# Patient Record
Sex: FEMALE | Race: WHITE | NOT HISPANIC OR LATINO | Employment: FULL TIME
[De-identification: names, ages, dates, MRNs, and addresses within clinical notes are randomized per-mention and may not be internally consistent; named-entity substitution may affect disease eponyms.]

---

## 2018-04-08 ENCOUNTER — CHARTING TRANS (OUTPATIENT)
Dept: OTHER | Age: 53
End: 2018-04-08

## 2018-04-21 ENCOUNTER — HOSPITAL ENCOUNTER (OUTPATIENT)
Dept: GENERAL RADIOLOGY | Age: 53
Discharge: HOME | End: 2018-04-21
Attending: INTERNAL MEDICINE
Payer: COMMERCIAL

## 2018-04-21 DIAGNOSIS — R05.9 COUGH: ICD-10-CM

## 2018-04-21 PROCEDURE — 71046 X-RAY EXAM CHEST 2 VIEWS: CPT | Performed by: INTERNAL MEDICINE

## 2018-11-01 VITALS
HEIGHT: 60 IN | WEIGHT: 160 LBS | TEMPERATURE: 99.9 F | SYSTOLIC BLOOD PRESSURE: 150 MMHG | DIASTOLIC BLOOD PRESSURE: 84 MMHG | OXYGEN SATURATION: 97 % | RESPIRATION RATE: 16 BRPM | BODY MASS INDEX: 31.41 KG/M2 | HEART RATE: 119 BPM

## 2019-02-17 ENCOUNTER — WALK IN (OUTPATIENT)
Dept: URGENT CARE | Age: 54
End: 2019-02-17

## 2019-02-17 DIAGNOSIS — J06.9 VIRAL UPPER RESPIRATORY TRACT INFECTION: Primary | ICD-10-CM

## 2019-02-17 PROCEDURE — 99214 OFFICE O/P EST MOD 30 MIN: CPT | Performed by: NURSE PRACTITIONER

## 2019-02-17 RX ORDER — BENZONATATE 200 MG/1
200 CAPSULE ORAL 3 TIMES DAILY PRN
Qty: 15 CAPSULE | Refills: 0 | Status: SHIPPED | OUTPATIENT
Start: 2019-02-17 | End: 2023-04-19 | Stop reason: CLARIF

## 2019-02-17 SDOH — HEALTH STABILITY: MENTAL HEALTH: HOW OFTEN DO YOU HAVE A DRINK CONTAINING ALCOHOL?: NEVER

## 2019-02-17 ASSESSMENT — ENCOUNTER SYMPTOMS
FEVER: 0
EYE DISCHARGE: 0
CHEST TIGHTNESS: 0
SORE THROAT: 0
RHINORRHEA: 0
CHILLS: 0
SHORTNESS OF BREATH: 0
COUGH: 1
DIZZINESS: 0
DIARRHEA: 0
WHEEZING: 0
PSYCHIATRIC NEGATIVE: 1
NAUSEA: 0
HEADACHES: 0

## 2019-02-17 ASSESSMENT — PAIN SCALES - GENERAL: PAINLEVEL: 3-4

## 2019-02-26 ENCOUNTER — WALK IN (OUTPATIENT)
Dept: URGENT CARE | Age: 54
End: 2019-02-26

## 2019-02-26 DIAGNOSIS — J01.90 ACUTE BACTERIAL SINUSITIS: Primary | ICD-10-CM

## 2019-02-26 DIAGNOSIS — B96.89 ACUTE BACTERIAL SINUSITIS: Primary | ICD-10-CM

## 2019-02-26 DIAGNOSIS — H65.191 OTHER ACUTE NONSUPPURATIVE OTITIS MEDIA OF RIGHT EAR, RECURRENCE NOT SPECIFIED: ICD-10-CM

## 2019-02-26 PROCEDURE — 99214 OFFICE O/P EST MOD 30 MIN: CPT | Performed by: NURSE PRACTITIONER

## 2019-02-26 RX ORDER — FLUTICASONE PROPIONATE 50 MCG
SPRAY, SUSPENSION (ML) NASAL
Qty: 16 G | Refills: 0 | Status: SHIPPED | OUTPATIENT
Start: 2019-02-26 | End: 2023-04-19 | Stop reason: CLARIF

## 2019-02-26 RX ORDER — AMOXICILLIN AND CLAVULANATE POTASSIUM 875; 125 MG/1; MG/1
1 TABLET, FILM COATED ORAL 2 TIMES DAILY
Qty: 20 TABLET | Refills: 0 | Status: SHIPPED | OUTPATIENT
Start: 2019-02-26 | End: 2019-03-08

## 2019-02-26 ASSESSMENT — ENCOUNTER SYMPTOMS
HEADACHES: 0
VOMITING: 0
DIZZINESS: 0
WHEEZING: 0
NAUSEA: 0
SORE THROAT: 0
SHORTNESS OF BREATH: 0
COUGH: 1
ABDOMINAL PAIN: 0
CHILLS: 0
FEVER: 0

## 2019-02-26 ASSESSMENT — PAIN SCALES - GENERAL: PAINLEVEL: 3-4

## 2020-10-30 ENCOUNTER — HOSPITAL ENCOUNTER (EMERGENCY)
Facility: HOSPITAL | Age: 55
Discharge: HOME | End: 2020-10-30
Attending: EMERGENCY MEDICINE
Payer: COMMERCIAL

## 2020-10-30 ENCOUNTER — APPOINTMENT (OUTPATIENT)
Dept: GENERAL RADIOLOGY | Facility: HOSPITAL | Age: 55
End: 2020-10-30
Payer: COMMERCIAL

## 2020-10-30 VITALS
RESPIRATION RATE: 29 BRPM | TEMPERATURE: 101 F | WEIGHT: 175 LBS | OXYGEN SATURATION: 98 % | DIASTOLIC BLOOD PRESSURE: 76 MMHG | BODY MASS INDEX: 35.28 KG/M2 | SYSTOLIC BLOOD PRESSURE: 138 MMHG | HEIGHT: 59 IN | HEART RATE: 109 BPM

## 2020-10-30 DIAGNOSIS — E87.6 HYPOKALEMIA: ICD-10-CM

## 2020-10-30 DIAGNOSIS — J06.9 UPPER RESPIRATORY TRACT INFECTION, UNSPECIFIED TYPE: Primary | ICD-10-CM

## 2020-10-30 PROCEDURE — 99285 EMERGENCY DEPT VISIT HI MDM: CPT

## 2020-10-30 PROCEDURE — 85025 COMPLETE CBC W/AUTO DIFF WBC: CPT

## 2020-10-30 PROCEDURE — 85025 COMPLETE CBC W/AUTO DIFF WBC: CPT | Performed by: EMERGENCY MEDICINE

## 2020-10-30 PROCEDURE — 94640 AIRWAY INHALATION TREATMENT: CPT

## 2020-10-30 PROCEDURE — 93010 ELECTROCARDIOGRAM REPORT: CPT

## 2020-10-30 PROCEDURE — 80053 COMPREHEN METABOLIC PANEL: CPT | Performed by: EMERGENCY MEDICINE

## 2020-10-30 PROCEDURE — 93005 ELECTROCARDIOGRAM TRACING: CPT

## 2020-10-30 PROCEDURE — 71045 X-RAY EXAM CHEST 1 VIEW: CPT | Performed by: EMERGENCY MEDICINE

## 2020-10-30 PROCEDURE — 80053 COMPREHEN METABOLIC PANEL: CPT

## 2020-10-30 PROCEDURE — 96374 THER/PROPH/DIAG INJ IV PUSH: CPT

## 2020-10-30 RX ORDER — ALBUTEROL SULFATE 90 UG/1
8 AEROSOL, METERED RESPIRATORY (INHALATION) 4 TIMES DAILY
Status: DISCONTINUED | OUTPATIENT
Start: 2020-10-30 | End: 2020-10-30

## 2020-10-30 RX ORDER — ACETAMINOPHEN 500 MG
1000 TABLET ORAL ONCE
Status: COMPLETED | OUTPATIENT
Start: 2020-10-30 | End: 2020-10-30

## 2020-10-30 RX ORDER — DEXAMETHASONE SODIUM PHOSPHATE 4 MG/ML
10 VIAL (ML) INJECTION ONCE
Status: COMPLETED | OUTPATIENT
Start: 2020-10-30 | End: 2020-10-30

## 2020-10-30 NOTE — ED PROVIDER NOTES
Patient Seen in: [facility] Emergency Department      History   Patient presents with:  Difficulty Breathing    Stated Complaint: covid pos    HPI    Patient is a 74-year-old female comes emergency room for evaluation of wheezing.   Patient states 9 sounds are present. Soft. nondistended, no pulsatile masses. nontender  MUSCULOSKELETAL: No calf tenderness. Dorsalis and Posterior Tibial pulses present. No clubbing. No cyanosis. No edema. SKIN EXAMINATIoN: Warm and dry with normal appearance.   No ra The heart is normal in size. The lungs are clear of acute-appearing disease process. The costophrenic angles are sharp. There is no active disease seen on the basis of portable chest radiography. CONCLUSION:  No active disease seen.    Dictate tract infection, unspecified type  (primary encounter diagnosis)  Hypokalemia    Disposition:  Discharge  10/30/2020  2:23 pm    Follow-up:  Dominic Galeas, [address]  [address]  [phone]      As needed          Medications Pr

## 2020-11-05 ENCOUNTER — HOSPITAL ENCOUNTER (EMERGENCY)
Facility: HOSPITAL | Age: 55
Discharge: HOME | End: 2020-11-05
Attending: EMERGENCY MEDICINE
Payer: COMMERCIAL

## 2020-11-05 ENCOUNTER — APPOINTMENT (OUTPATIENT)
Dept: GENERAL RADIOLOGY | Facility: HOSPITAL | Age: 55
End: 2020-11-05
Attending: EMERGENCY MEDICINE
Payer: COMMERCIAL

## 2020-11-05 VITALS
DIASTOLIC BLOOD PRESSURE: 77 MMHG | OXYGEN SATURATION: 99 % | TEMPERATURE: 98 F | SYSTOLIC BLOOD PRESSURE: 129 MMHG | RESPIRATION RATE: 18 BRPM | HEART RATE: 90 BPM

## 2020-11-05 DIAGNOSIS — U07.1 COVID-19: Primary | ICD-10-CM

## 2020-11-05 PROCEDURE — 99285 EMERGENCY DEPT VISIT HI MDM: CPT

## 2020-11-05 PROCEDURE — 85025 COMPLETE CBC W/AUTO DIFF WBC: CPT | Performed by: EMERGENCY MEDICINE

## 2020-11-05 PROCEDURE — 93005 ELECTROCARDIOGRAM TRACING: CPT

## 2020-11-05 PROCEDURE — 71045 X-RAY EXAM CHEST 1 VIEW: CPT | Performed by: EMERGENCY MEDICINE

## 2020-11-05 PROCEDURE — 93010 ELECTROCARDIOGRAM REPORT: CPT

## 2020-11-05 PROCEDURE — 84484 ASSAY OF TROPONIN QUANT: CPT | Performed by: EMERGENCY MEDICINE

## 2020-11-05 PROCEDURE — 85379 FIBRIN DEGRADATION QUANT: CPT | Performed by: EMERGENCY MEDICINE

## 2020-11-05 PROCEDURE — 96374 THER/PROPH/DIAG INJ IV PUSH: CPT

## 2020-11-05 PROCEDURE — 80053 COMPREHEN METABOLIC PANEL: CPT | Performed by: EMERGENCY MEDICINE

## 2020-11-05 PROCEDURE — 99453 REM MNTR PHYSIOL PARAM SETUP: CPT

## 2020-11-05 RX ORDER — ALBUTEROL SULFATE 90 UG/1
AEROSOL, METERED RESPIRATORY (INHALATION) EVERY 6 HOURS PRN
COMMUNITY

## 2020-11-05 RX ORDER — PREDNISONE 20 MG/1
40 TABLET ORAL DAILY
Qty: 8 TABLET | Refills: 0 | Status: SHIPPED | OUTPATIENT
Start: 2020-11-05 | End: 2020-11-09

## 2020-11-05 RX ORDER — ALBUTEROL SULFATE 90 UG/1
2 AEROSOL, METERED RESPIRATORY (INHALATION) EVERY 4 HOURS PRN
Qty: 1 INHALER | Refills: 0 | Status: SHIPPED | OUTPATIENT
Start: 2020-11-05 | End: 2020-12-05

## 2020-11-05 RX ORDER — METHYLPREDNISOLONE SODIUM SUCCINATE 125 MG/2ML
80 INJECTION, POWDER, LYOPHILIZED, FOR SOLUTION INTRAMUSCULAR; INTRAVENOUS ONCE
Status: COMPLETED | OUTPATIENT
Start: 2020-11-05 | End: 2020-11-05

## 2020-11-05 NOTE — ED PROVIDER NOTES
Patient Seen in: [facility] Emergency Department      History   Patient presents with:  Covid    Stated Complaint: +COVID. inhaler not helping    HPI    This is a 53-year-old woman, recently tested positive for COVID-19 infection approximately 1 wee Course     Labs Reviewed   COMP METABOLIC PANEL (14) - Abnormal; Notable for the following components:       Result Value    Glucose 114 (*)     Creatinine 0.52 (*)     Albumin 3.2 (*)     A/G Ratio 0.8 (*)     All other components within normal limits   C lung bases. This may represent areas of early pneumonia. If clinical symptoms persist consider followup radiographs or CT.    Dictated by (CST): James Landin MD on 11/05/2020 at 1:17 PM     Finalized by (CST): James Landin MD on 11/05/2020 at 1:19 PM       Xr pm    Follow-up:  Charisse Stratton, [address]  [address]  [phone]    Call in 1 day      [facility] Emergency Department  [address]  [address] [facility]  [phone]    As needed, If symptoms worsen

## 2020-11-05 NOTE — ED NOTES
Pt provided incentive spirometer, pulse oximeter, and inhaler chamber. Pt verbalized understanding on how each work. No questions upon discharge.

## 2020-11-13 ENCOUNTER — HOSPITAL ENCOUNTER (EMERGENCY)
Facility: HOSPITAL | Age: 55
Discharge: HOME | End: 2020-11-13
Attending: EMERGENCY MEDICINE
Payer: COMMERCIAL

## 2020-11-13 ENCOUNTER — APPOINTMENT (OUTPATIENT)
Dept: GENERAL RADIOLOGY | Facility: HOSPITAL | Age: 55
End: 2020-11-13
Attending: EMERGENCY MEDICINE
Payer: COMMERCIAL

## 2020-11-13 VITALS
DIASTOLIC BLOOD PRESSURE: 84 MMHG | SYSTOLIC BLOOD PRESSURE: 144 MMHG | TEMPERATURE: 99 F | HEIGHT: 59 IN | OXYGEN SATURATION: 98 % | WEIGHT: 160 LBS | BODY MASS INDEX: 32.25 KG/M2 | RESPIRATION RATE: 18 BRPM | HEART RATE: 108 BPM

## 2020-11-13 DIAGNOSIS — R00.2 PALPITATIONS: Primary | ICD-10-CM

## 2020-11-13 PROCEDURE — 93010 ELECTROCARDIOGRAM REPORT: CPT

## 2020-11-13 PROCEDURE — 85379 FIBRIN DEGRADATION QUANT: CPT | Performed by: EMERGENCY MEDICINE

## 2020-11-13 PROCEDURE — 81001 URINALYSIS AUTO W/SCOPE: CPT | Performed by: EMERGENCY MEDICINE

## 2020-11-13 PROCEDURE — 84484 ASSAY OF TROPONIN QUANT: CPT | Performed by: EMERGENCY MEDICINE

## 2020-11-13 PROCEDURE — 83735 ASSAY OF MAGNESIUM: CPT | Performed by: EMERGENCY MEDICINE

## 2020-11-13 PROCEDURE — 71045 X-RAY EXAM CHEST 1 VIEW: CPT | Performed by: EMERGENCY MEDICINE

## 2020-11-13 PROCEDURE — 96360 HYDRATION IV INFUSION INIT: CPT

## 2020-11-13 PROCEDURE — 87086 URINE CULTURE/COLONY COUNT: CPT | Performed by: EMERGENCY MEDICINE

## 2020-11-13 PROCEDURE — 93005 ELECTROCARDIOGRAM TRACING: CPT

## 2020-11-13 PROCEDURE — 85025 COMPLETE CBC W/AUTO DIFF WBC: CPT | Performed by: EMERGENCY MEDICINE

## 2020-11-13 PROCEDURE — 99285 EMERGENCY DEPT VISIT HI MDM: CPT

## 2020-11-13 PROCEDURE — 80053 COMPREHEN METABOLIC PANEL: CPT | Performed by: EMERGENCY MEDICINE

## 2020-11-13 NOTE — ED PROVIDER NOTES
Patient Seen in: [facility] Emergency Department      History   Patient presents with:  Arrythmia/Palpitations  Difficulty Breathing    Stated Complaint: Rapid heart rate, shortness of breath    HPI    61-year-old otherwise healthy female presents t Appearance: Normal appearance. HENT:      Head: Normocephalic and atraumatic. Nose: Nose normal.      Mouth/Throat:      Mouth: Mucous membranes are moist.   Eyes:      Extraocular Movements: Extraocular movements intact.       Pupils: Pupils ar DIFFERENTIAL WITH PLATELET.   Procedure                               Abnormality         Status                     ---------                               -----------         ------                     CBC W/ DIFFERENTIAL[[id number]]          Abnormal opacities are present at the lung bases. CONCLUSION:  There are some subtle ground-glass opacities of the lung bases. This may represent areas of early pneumonia. If clinical symptoms persist consider followup radiographs or CT.    Dictated by good despite not having albuterol. I advised her to take albuterol only as needed at home as it may be contributory to her high heart rate. Patient was also noted to be hyperglycemic.   I suspect this may be from the prednisone which she is currently bein

## 2020-11-13 NOTE — ED NOTES
Patient continues to take her mask off. Patient was reminded 3 times to please keep her mask on. Patient also want's an IS patient was informed that she will not be provided on here since she already has one at home.

## 2020-12-19 ENCOUNTER — APPOINTMENT (OUTPATIENT)
Dept: GENERAL RADIOLOGY | Facility: HOSPITAL | Age: 55
End: 2020-12-19
Attending: EMERGENCY MEDICINE
Payer: COMMERCIAL

## 2020-12-19 ENCOUNTER — HOSPITAL ENCOUNTER (EMERGENCY)
Facility: HOSPITAL | Age: 55
Discharge: HOME | End: 2020-12-20
Attending: EMERGENCY MEDICINE
Payer: COMMERCIAL

## 2020-12-19 DIAGNOSIS — R06.00 DYSPNEA ON EXERTION: Primary | ICD-10-CM

## 2020-12-19 DIAGNOSIS — R06.00 DYSPNEA: ICD-10-CM

## 2020-12-19 PROCEDURE — 93010 ELECTROCARDIOGRAM REPORT: CPT

## 2020-12-19 PROCEDURE — 84484 ASSAY OF TROPONIN QUANT: CPT | Performed by: EMERGENCY MEDICINE

## 2020-12-19 PROCEDURE — 36415 COLL VENOUS BLD VENIPUNCTURE: CPT

## 2020-12-19 PROCEDURE — 80053 COMPREHEN METABOLIC PANEL: CPT | Performed by: EMERGENCY MEDICINE

## 2020-12-19 PROCEDURE — 85025 COMPLETE CBC W/AUTO DIFF WBC: CPT | Performed by: EMERGENCY MEDICINE

## 2020-12-19 PROCEDURE — 71045 X-RAY EXAM CHEST 1 VIEW: CPT | Performed by: EMERGENCY MEDICINE

## 2020-12-19 PROCEDURE — 99285 EMERGENCY DEPT VISIT HI MDM: CPT

## 2020-12-19 PROCEDURE — 85379 FIBRIN DEGRADATION QUANT: CPT | Performed by: EMERGENCY MEDICINE

## 2020-12-19 PROCEDURE — 93005 ELECTROCARDIOGRAM TRACING: CPT

## 2020-12-20 VITALS
SYSTOLIC BLOOD PRESSURE: 136 MMHG | HEIGHT: 59 IN | DIASTOLIC BLOOD PRESSURE: 77 MMHG | HEART RATE: 87 BPM | RESPIRATION RATE: 12 BRPM | TEMPERATURE: 99 F | BODY MASS INDEX: 33.67 KG/M2 | WEIGHT: 167 LBS | OXYGEN SATURATION: 99 %

## 2020-12-20 NOTE — ED PROVIDER NOTES
Patient Seen in: [facility] Emergency Department      History   Patient presents with:  Difficulty Breathing    Stated Complaint: sob    HPI    65-year-old female presents to ED with complaint of shortness of breath, postnasal drip and loose stools Extraocular movements intact. Conjunctiva/sclera: Conjunctivae normal.      Pupils: Pupils are equal, round, and reactive to light. Neck:      Musculoskeletal: Normal range of motion and neck supple.    Cardiovascular:      Rate and Rhythm: Normal ra EKG    Rate, intervals and axes as noted on EKG Report.   Rate: 99  Rhythm: Sinus Rhythm  Reading: No contiguous ST-T wave abnormality              Xr Chest Ap Portable  (cpt=71045)    Result Date: 12/19/2020  PROCEDURE:  XR CHEST AP PORTABLE  (CPT=7104 2:01 am    Follow-up:  Carolina Castillo, [address] [facility]  [phone]    In 2 days  If symptoms worsen          Medications Prescribed:  Discharge Medication List as of 12/20/2020  2:12 AM

## 2020-12-20 NOTE — CM/SW NOTE
Patient needs an outpatient stress echo scheduled. Verified patient's cell phone [phone] and explained to her that she needs to call for an appointment Monday morning and that ED RN CM will call her Monday to verify that she is scheduled.

## 2020-12-20 NOTE — ED INITIAL ASSESSMENT (HPI)
Pt here for increase SOB, post nasal drip, loose bowels today. Pt states COVID + on 10/29 which lasted a month.

## 2020-12-21 ENCOUNTER — ORDER TRANSCRIPTION (OUTPATIENT)
Dept: ADMINISTRATIVE | Facility: HOSPITAL | Age: 55
End: 2020-12-21

## 2020-12-21 DIAGNOSIS — Z01.818 PREOP EXAMINATION: ICD-10-CM

## 2020-12-21 DIAGNOSIS — Z11.59 SCREENING FOR VIRAL DISEASE: ICD-10-CM

## 2020-12-21 DIAGNOSIS — R06.02 SHORTNESS OF BREATH: Primary | ICD-10-CM

## 2020-12-21 NOTE — CM/SW NOTE
Vikas Aragon from central scheduling will be calling patient to schedule a covid test and the stress echo.

## 2020-12-23 ENCOUNTER — LAB ENCOUNTER (OUTPATIENT)
Dept: LAB | Age: 55
End: 2020-12-23
Attending: EMERGENCY MEDICINE
Payer: COMMERCIAL

## 2020-12-23 DIAGNOSIS — R06.00 DYSPNEA: ICD-10-CM

## 2020-12-26 ENCOUNTER — HOSPITAL ENCOUNTER (OUTPATIENT)
Dept: CV DIAGNOSTICS | Facility: HOSPITAL | Age: 55
Discharge: HOME | End: 2020-12-26
Attending: EMERGENCY MEDICINE
Payer: COMMERCIAL

## 2020-12-26 DIAGNOSIS — R06.00 DYSPNEA ON EXERTION: ICD-10-CM

## 2020-12-26 PROCEDURE — 93017 CV STRESS TEST TRACING ONLY: CPT | Performed by: EMERGENCY MEDICINE

## 2020-12-26 PROCEDURE — 93350 STRESS TTE ONLY: CPT | Performed by: EMERGENCY MEDICINE

## 2020-12-26 PROCEDURE — 93018 CV STRESS TEST I&R ONLY: CPT | Performed by: EMERGENCY MEDICINE

## 2021-01-13 ENCOUNTER — LAB ENCOUNTER (OUTPATIENT)
Dept: LAB | Age: 56
End: 2021-01-13
Attending: NURSE PRACTITIONER
Payer: COMMERCIAL

## 2021-01-13 ENCOUNTER — LAB ENCOUNTER (OUTPATIENT)
Dept: LAB | Facility: HOSPITAL | Age: 56
End: 2021-01-13
Attending: NURSE PRACTITIONER
Payer: COMMERCIAL

## 2021-01-13 DIAGNOSIS — Z11.59 SCREENING FOR VIRAL DISEASE: ICD-10-CM

## 2021-01-13 DIAGNOSIS — R06.02 SHORTNESS OF BREATH: ICD-10-CM

## 2021-01-13 DIAGNOSIS — Z01.818 PREOP EXAMINATION: ICD-10-CM

## 2021-01-16 ENCOUNTER — RT VISIT (OUTPATIENT)
Dept: RESPIRATORY THERAPY | Facility: HOSPITAL | Age: 56
End: 2021-01-16
Attending: NURSE PRACTITIONER
Payer: COMMERCIAL

## 2021-01-16 DIAGNOSIS — R06.02 SHORTNESS OF BREATH: ICD-10-CM

## 2021-01-16 PROCEDURE — 94729 DIFFUSING CAPACITY: CPT

## 2021-01-16 PROCEDURE — 94726 PLETHYSMOGRAPHY LUNG VOLUMES: CPT

## 2021-01-16 PROCEDURE — 94010 BREATHING CAPACITY TEST: CPT

## 2021-01-24 NOTE — PROCEDURES
Findings:  FEV1 is 2.39L, 107% predicted. FVC is 2.82L, 101% predicted. FEV1/ FVC ratio is 0.85. The flow-volume loop demonstrates a normal pattern. The TLC is 4.62L, 113% predicted. The residual volume 1.80L, 115% predicted.   The diffusion capacity i

## 2021-05-26 VITALS
SYSTOLIC BLOOD PRESSURE: 156 MMHG | RESPIRATION RATE: 18 BRPM | WEIGHT: 160 LBS | OXYGEN SATURATION: 98 % | BODY MASS INDEX: 31.41 KG/M2 | OXYGEN SATURATION: 98 % | DIASTOLIC BLOOD PRESSURE: 80 MMHG | HEART RATE: 120 BPM | HEIGHT: 60 IN | SYSTOLIC BLOOD PRESSURE: 150 MMHG | TEMPERATURE: 99.1 F | TEMPERATURE: 98.2 F | DIASTOLIC BLOOD PRESSURE: 88 MMHG | HEART RATE: 120 BPM

## 2022-09-08 ENCOUNTER — TELEPHONE (OUTPATIENT)
Dept: SCHEDULING | Age: 57
End: 2022-09-08

## 2023-04-19 ENCOUNTER — WALK IN (OUTPATIENT)
Dept: URGENT CARE | Age: 58
End: 2023-04-19

## 2023-04-19 VITALS
DIASTOLIC BLOOD PRESSURE: 90 MMHG | TEMPERATURE: 98.2 F | BODY MASS INDEX: 35.02 KG/M2 | WEIGHT: 178.35 LBS | SYSTOLIC BLOOD PRESSURE: 160 MMHG | OXYGEN SATURATION: 100 % | RESPIRATION RATE: 18 BRPM | HEART RATE: 100 BPM | HEIGHT: 60 IN

## 2023-04-19 DIAGNOSIS — R03.0 ELEVATED BLOOD PRESSURE READING WITHOUT DIAGNOSIS OF HYPERTENSION: ICD-10-CM

## 2023-04-19 DIAGNOSIS — L60.0 INGROWN NAIL OF FIFTH TOE OF LEFT FOOT: Primary | ICD-10-CM

## 2023-04-19 PROCEDURE — 99213 OFFICE O/P EST LOW 20 MIN: CPT | Performed by: NURSE PRACTITIONER

## 2023-04-19 RX ORDER — CEPHALEXIN 500 MG/1
500 CAPSULE ORAL 2 TIMES DAILY
Qty: 14 CAPSULE | Refills: 0 | Status: SHIPPED | OUTPATIENT
Start: 2023-04-19 | End: 2023-04-26

## 2023-04-19 RX ORDER — CYCLOSPORINE 0.5 MG/ML
EMULSION OPHTHALMIC
COMMUNITY
Start: 2023-04-01

## 2025-04-05 ENCOUNTER — APPOINTMENT (OUTPATIENT)
Dept: CV DIAGNOSTICS | Facility: HOSPITAL | Age: 60
End: 2025-04-05
Attending: HOSPITALIST
Payer: COMMERCIAL

## 2025-04-05 ENCOUNTER — HOSPITAL ENCOUNTER (OUTPATIENT)
Facility: HOSPITAL | Age: 60
Setting detail: OBSERVATION
Discharge: HOME | End: 2025-04-06
Attending: STUDENT IN AN ORGANIZED HEALTH CARE EDUCATION/TRAINING PROGRAM | Admitting: HOSPITALIST
Payer: COMMERCIAL

## 2025-04-05 ENCOUNTER — APPOINTMENT (OUTPATIENT)
Dept: GENERAL RADIOLOGY | Facility: HOSPITAL | Age: 60
End: 2025-04-05
Attending: STUDENT IN AN ORGANIZED HEALTH CARE EDUCATION/TRAINING PROGRAM
Payer: COMMERCIAL

## 2025-04-05 DIAGNOSIS — R73.9 HYPERGLYCEMIA: ICD-10-CM

## 2025-04-05 DIAGNOSIS — R00.0 TACHYCARDIA: Primary | ICD-10-CM

## 2025-04-05 DIAGNOSIS — I31.39 PERICARDIAL EFFUSION (HCC): ICD-10-CM

## 2025-04-05 DIAGNOSIS — I10 HYPERTENSION, UNSPECIFIED TYPE: ICD-10-CM

## 2025-04-05 LAB
ALBUMIN SERPL-MCNC: 4.9 G/DL (ref 3.2–4.8)
ALBUMIN/GLOB SERPL: 1.8 {RATIO} (ref 1–2)
ALP LIVER SERPL-CCNC: 81 U/L
ALT SERPL-CCNC: 51 U/L
ANION GAP SERPL CALC-SCNC: 11 MMOL/L (ref 0–18)
AST SERPL-CCNC: 35 U/L (ref ?–34)
ATRIAL RATE: 134 BPM
BASOPHILS # BLD AUTO: 0.05 X10(3) UL (ref 0–0.2)
BASOPHILS NFR BLD AUTO: 0.4 %
BILIRUB SERPL-MCNC: 0.4 MG/DL (ref 0.3–1.2)
BILIRUB UR QL STRIP.AUTO: NEGATIVE
BUN BLD-MCNC: 15 MG/DL (ref 9–23)
CALCIUM BLD-MCNC: 9.8 MG/DL (ref 8.7–10.6)
CHLORIDE SERPL-SCNC: 101 MMOL/L (ref 98–112)
CLARITY UR REFRACT.AUTO: CLEAR
CO2 SERPL-SCNC: 24 MMOL/L (ref 21–32)
CREAT BLD-MCNC: 0.79 MG/DL
CRP SERPL-MCNC: 1.4 MG/DL (ref ?–0.5)
D DIMER PPP FEU-MCNC: <0.27 UG/ML FEU (ref ?–0.57)
EGFRCR SERPLBLD CKD-EPI 2021: 87 ML/MIN/1.73M2 (ref 60–?)
EOSINOPHIL # BLD AUTO: 0.1 X10(3) UL (ref 0–0.7)
EOSINOPHIL NFR BLD AUTO: 0.8 %
ERYTHROCYTE [DISTWIDTH] IN BLOOD BY AUTOMATED COUNT: 12 %
ERYTHROCYTE [SEDIMENTATION RATE] IN BLOOD: 8 MM/HR
EST. AVERAGE GLUCOSE BLD GHB EST-MCNC: 189 MG/DL (ref 68–126)
FLUAV + FLUBV RNA SPEC NAA+PROBE: NEGATIVE
FLUAV + FLUBV RNA SPEC NAA+PROBE: NEGATIVE
GLOBULIN PLAS-MCNC: 2.8 G/DL (ref 2–3.5)
GLUCOSE BLD-MCNC: 164 MG/DL (ref 70–99)
GLUCOSE BLD-MCNC: 189 MG/DL (ref 70–99)
GLUCOSE BLD-MCNC: 198 MG/DL (ref 70–99)
GLUCOSE BLD-MCNC: 265 MG/DL (ref 70–99)
GLUCOSE UR STRIP.AUTO-MCNC: 1000 MG/DL
HBA1C MFR BLD: 8.2 % (ref ?–5.7)
HCT VFR BLD AUTO: 41.2 %
HGB BLD-MCNC: 14.8 G/DL
IMM GRANULOCYTES # BLD AUTO: 0.06 X10(3) UL (ref 0–1)
IMM GRANULOCYTES NFR BLD: 0.5 %
KETONES UR STRIP.AUTO-MCNC: 20 MG/DL
LEUKOCYTE ESTERASE UR QL STRIP.AUTO: NEGATIVE
LYMPHOCYTES # BLD AUTO: 2.55 X10(3) UL (ref 1–4)
LYMPHOCYTES NFR BLD AUTO: 21.3 %
MCH RBC QN AUTO: 29.4 PG (ref 26–34)
MCHC RBC AUTO-ENTMCNC: 35.9 G/DL (ref 31–37)
MCV RBC AUTO: 81.9 FL
MONOCYTES # BLD AUTO: 0.93 X10(3) UL (ref 0.1–1)
MONOCYTES NFR BLD AUTO: 7.8 %
NEUTROPHILS # BLD AUTO: 8.26 X10 (3) UL (ref 1.5–7.7)
NEUTROPHILS # BLD AUTO: 8.26 X10(3) UL (ref 1.5–7.7)
NEUTROPHILS NFR BLD AUTO: 69.2 %
NITRITE UR QL STRIP.AUTO: NEGATIVE
NT-PROBNP SERPL-MCNC: <35 PG/ML (ref ?–125)
OSMOLALITY SERPL CALC.SUM OF ELEC: 292 MOSM/KG (ref 275–295)
P AXIS: 63 DEGREES
P-R INTERVAL: 144 MS
PH UR STRIP.AUTO: 5.5 [PH] (ref 5–8)
PLATELET # BLD AUTO: 245 10(3)UL (ref 150–450)
POTASSIUM SERPL-SCNC: 3.7 MMOL/L (ref 3.5–5.1)
PROCALCITONIN SERPL-MCNC: 0.45 NG/ML (ref ?–0.05)
PROT SERPL-MCNC: 7.7 G/DL (ref 5.7–8.2)
PROT UR STRIP.AUTO-MCNC: NEGATIVE MG/DL
Q-T INTERVAL: 280 MS
QRS DURATION: 70 MS
QTC CALCULATION (BEZET): 418 MS
R AXIS: 24 DEGREES
RBC # BLD AUTO: 5.03 X10(6)UL
RBC UR QL AUTO: NEGATIVE
RSV RNA SPEC NAA+PROBE: NEGATIVE
SARS-COV-2 RNA RESP QL NAA+PROBE: NOT DETECTED
SODIUM SERPL-SCNC: 136 MMOL/L (ref 136–145)
SP GR UR STRIP.AUTO: 1 (ref 1–1.03)
T AXIS: 37 DEGREES
TROPONIN I SERPL HS-MCNC: <3 NG/L
TROPONIN I SERPL HS-MCNC: <3 NG/L
TSI SER-ACNC: 2.37 UIU/ML (ref 0.55–4.78)
UROBILINOGEN UR STRIP.AUTO-MCNC: NORMAL MG/DL
VENTRICULAR RATE: 134 BPM
WBC # BLD AUTO: 12 X10(3) UL (ref 4–11)

## 2025-04-05 PROCEDURE — 99223 1ST HOSP IP/OBS HIGH 75: CPT | Performed by: HOSPITALIST

## 2025-04-05 PROCEDURE — 71045 X-RAY EXAM CHEST 1 VIEW: CPT | Performed by: STUDENT IN AN ORGANIZED HEALTH CARE EDUCATION/TRAINING PROGRAM

## 2025-04-05 PROCEDURE — 93306 TTE W/DOPPLER COMPLETE: CPT | Performed by: HOSPITALIST

## 2025-04-05 RX ORDER — SODIUM PHOSPHATE, DIBASIC AND SODIUM PHOSPHATE, MONOBASIC 7; 19 G/230ML; G/230ML
1 ENEMA RECTAL ONCE AS NEEDED
Status: DISCONTINUED | OUTPATIENT
Start: 2025-04-05 | End: 2025-04-07

## 2025-04-05 RX ORDER — LISINOPRIL 10 MG/1
10 TABLET ORAL DAILY
COMMUNITY
End: 2025-04-06

## 2025-04-05 RX ORDER — POLYETHYLENE GLYCOL 3350 17 G/17G
17 POWDER, FOR SOLUTION ORAL DAILY PRN
Status: DISCONTINUED | OUTPATIENT
Start: 2025-04-05 | End: 2025-04-07

## 2025-04-05 RX ORDER — PROCHLORPERAZINE EDISYLATE 5 MG/ML
5 INJECTION INTRAMUSCULAR; INTRAVENOUS EVERY 8 HOURS PRN
Status: DISCONTINUED | OUTPATIENT
Start: 2025-04-05 | End: 2025-04-07

## 2025-04-05 RX ORDER — OMEPRAZOLE 20 MG/1
40 CAPSULE, DELAYED RELEASE ORAL
COMMUNITY

## 2025-04-05 RX ORDER — ONDANSETRON 2 MG/ML
4 INJECTION INTRAMUSCULAR; INTRAVENOUS EVERY 6 HOURS PRN
Status: DISCONTINUED | OUTPATIENT
Start: 2025-04-05 | End: 2025-04-07

## 2025-04-05 RX ORDER — POTASSIUM CHLORIDE 1500 MG/1
40 TABLET, EXTENDED RELEASE ORAL ONCE
Status: COMPLETED | OUTPATIENT
Start: 2025-04-05 | End: 2025-04-05

## 2025-04-05 RX ORDER — NICOTINE POLACRILEX 4 MG
15 LOZENGE BUCCAL
Status: DISCONTINUED | OUTPATIENT
Start: 2025-04-05 | End: 2025-04-07

## 2025-04-05 RX ORDER — LISINOPRIL 10 MG/1
10 TABLET ORAL DAILY
Status: DISCONTINUED | OUTPATIENT
Start: 2025-04-05 | End: 2025-04-05

## 2025-04-05 RX ORDER — SENNOSIDES 8.6 MG
17.2 TABLET ORAL NIGHTLY PRN
Status: DISCONTINUED | OUTPATIENT
Start: 2025-04-05 | End: 2025-04-07

## 2025-04-05 RX ORDER — GARLIC 5000 MCG
1 TABLET ORAL DAILY
COMMUNITY

## 2025-04-05 RX ORDER — DEXTROSE MONOHYDRATE 25 G/50ML
50 INJECTION, SOLUTION INTRAVENOUS
Status: DISCONTINUED | OUTPATIENT
Start: 2025-04-05 | End: 2025-04-07

## 2025-04-05 RX ORDER — VITAMIN E 268 MG
400 CAPSULE ORAL DAILY
Status: DISCONTINUED | OUTPATIENT
Start: 2025-04-05 | End: 2025-04-07

## 2025-04-05 RX ORDER — BISACODYL 10 MG
10 SUPPOSITORY, RECTAL RECTAL
Status: DISCONTINUED | OUTPATIENT
Start: 2025-04-05 | End: 2025-04-07

## 2025-04-05 RX ORDER — UBIDECARENONE 30 MG
100 CAPSULE ORAL DAILY
COMMUNITY

## 2025-04-05 RX ORDER — MULTIVIT-MIN/IRON/FOLIC ACID/K 18-600-40
2500 CAPSULE ORAL ONCE
COMMUNITY

## 2025-04-05 RX ORDER — CYCLOSPORINE 0.5 MG/ML
1 EMULSION OPHTHALMIC 2 TIMES DAILY
Status: DISCONTINUED | OUTPATIENT
Start: 2025-04-05 | End: 2025-04-06

## 2025-04-05 RX ORDER — CYCLOSPORINE 0.5 MG/ML
1 EMULSION OPHTHALMIC 2 TIMES DAILY
Status: DISCONTINUED | OUTPATIENT
Start: 2025-04-05 | End: 2025-04-05 | Stop reason: SDUPTHER

## 2025-04-05 RX ORDER — DOXEPIN HYDROCHLORIDE 50 MG/1
1 CAPSULE ORAL DAILY
Status: DISCONTINUED | OUTPATIENT
Start: 2025-04-05 | End: 2025-04-07

## 2025-04-05 RX ORDER — LABETALOL HYDROCHLORIDE 5 MG/ML
20 INJECTION, SOLUTION INTRAVENOUS ONCE
Status: DISCONTINUED | OUTPATIENT
Start: 2025-04-05 | End: 2025-04-05

## 2025-04-05 RX ORDER — MULTIVIT WITH MINERALS/LUTEIN
400 TABLET ORAL DAILY
COMMUNITY

## 2025-04-05 RX ORDER — METOPROLOL SUCCINATE 50 MG/1
50 TABLET, EXTENDED RELEASE ORAL
Status: DISCONTINUED | OUTPATIENT
Start: 2025-04-05 | End: 2025-04-07

## 2025-04-05 RX ORDER — CYCLOSPORINE 0.5 MG/ML
1 EMULSION OPHTHALMIC 2 TIMES DAILY
COMMUNITY

## 2025-04-05 RX ORDER — SODIUM CHLORIDE 9 MG/ML
INJECTION, SOLUTION INTRAVENOUS CONTINUOUS
Status: ACTIVE | OUTPATIENT
Start: 2025-04-05 | End: 2025-04-06

## 2025-04-05 RX ORDER — NICOTINE POLACRILEX 4 MG
30 LOZENGE BUCCAL
Status: DISCONTINUED | OUTPATIENT
Start: 2025-04-05 | End: 2025-04-07

## 2025-04-05 RX ORDER — PANTOPRAZOLE SODIUM 40 MG/1
40 TABLET, DELAYED RELEASE ORAL
Status: DISCONTINUED | OUTPATIENT
Start: 2025-04-06 | End: 2025-04-07

## 2025-04-05 RX ORDER — UBIDECARENONE 30 MG
100 CAPSULE ORAL DAILY
Status: DISCONTINUED | OUTPATIENT
Start: 2025-04-05 | End: 2025-04-05 | Stop reason: RX

## 2025-04-05 RX ORDER — ACETAMINOPHEN 500 MG
1000 TABLET ORAL EVERY 6 HOURS PRN
Status: DISCONTINUED | OUTPATIENT
Start: 2025-04-05 | End: 2025-04-07

## 2025-04-05 RX ORDER — CHOLECALCIFEROL (VITAMIN D3) 50 MCG
2000 TABLET ORAL DAILY
Status: DISCONTINUED | OUTPATIENT
Start: 2025-04-05 | End: 2025-04-07

## 2025-04-05 NOTE — PLAN OF CARE
NURSING ADMISSION NOTE      Patient admitted via Cart  Oriented to room.  Safety precautions initiated.  Bed in low position.  Call light in reach.    Admission assessment complete. Admission navigator complete. Admission orders received and initiated. Safety precautions reviewed with patient and in place.    Assumed care of patient from the ED.   Alert and oriented x4. Anxious.  Sinus rhythm/Sinus tachycardia on tele. Denies chest pain and palpitations.  Room air, lung sounds clear bilaterally, no cough noted. Denies shortness of breath and dizziness.   Complaints of cramping to lower extremities. Heat pack given with relief.  Continent of bowel and bladder.   Glasses present at bedside.  Safety precautions maintained.    Home vitamin supplements recorded in med rec. Patient educated not to take the supplements that cannot be verified by pharmacy. Patient agreed to take the hospital-supplied supplements.    Discussed plan of care with patient. All questions answered.    Problem: Diabetes/Glucose Control  Goal: Glucose maintained within prescribed range  Description: INTERVENTIONS:- Monitor Blood Glucose as ordered- Assess for signs and symptoms of hyperglycemia and hypoglycemia- Administer ordered medications to maintain glucose within target range- Assess barriers to adequate nutritional intake and initiate nutrition consult as needed- Instruct patient on self management of diabetes  Outcome: Progressing     Problem: Patient/Family Goals  Goal: Patient/Family Long Term Goal  Description: Patient's Long Term Goal: Go home    Interventions:  - routine lab draws  - medication compliance   - MD rounding  - See additional Care Plan goals for specific interventions  Outcome: Progressing  Goal: Patient/Family Short Term Goal  Description: Patient's Short Term Goal: pain free    Interventions:   -heat/ice pack  -pain medication  -See additional Care Plan goals for specific interventions  Outcome: Progressing

## 2025-04-05 NOTE — PROGRESS NOTES
Pharmacy Note: Dietary Supplement Discontinuation Per Policy     The order for Garlic and Coenzyme Q-10 have been discontinued on [facility] per CLIN_149 policy on Dietary and Herbal Supplements.    Use of non-formulary dietary and herbal supplements are prohibited while patients are admitted as inpatients. Supplements considered for formulary status are reviewed through the process defined in CLIN_101 Formulary (Medications and Dietary Supplements). Physician orders for non-formulary supplements or herbal remedies will automatically be discontinued by the pharmacy department per protocol during verification. These supplements and herbal remedies are not placed on the MAR (medication administration record), entered, verified, or reviewed by a pharmacist, or dispensed from the pharmacy department.     Patients bringing in herbal or dietary supplements into the hospital will be asked to send them home. If it is not possible to take the items home, they are stored securely in the Pharmacy until the patient is discharged. The patient is to take all supplements home at time of discharge.    Thank you for the opportunity to assist in the care of this patient,   Laura Medley, IlianaD

## 2025-04-05 NOTE — CONSULTS
Cardiology Consultation    Lin Ching Patient Status:  Observation    1965 MRN [id number]   Location [facility] 2NE-A Attending Myron Singh MD   Hosp Day # 0 PCP None Pcp     Reason for Consultation:  Sinus tachycardia      History of Present Illness:  Lin Ching is a a(n) 59 year old female. Nice obese woman who reports she has had issues with swallowing and belching the past few weeks.  A few days of watery stools.  Over the past week, her heart has been beating forcefully and rapidly.  She has had some orthostatic dizziness.  Saw primary who prescribed lisinopril and omeprazole.  This morning, the palpitations with any activity were so concerning she came to the ER.  Here, she has been in sinus tachycardia, initially 558815 bpm, hydrated and now 100-110.  Bedside echo shows hyperdynamic LVEF, very small effusion.  Mild leukocytosis, HbA1C 8.2.  Procal 0.45.    History:  Past Medical History:    Back problem    Esophageal reflux    High blood pressure    History of adverse reaction to anesthesia    dyspnea when coming out of anesthesia    Visual impairment     Past Surgical History:   Procedure Laterality Date     delivery only      Cholecystectomy      Removal gallbladder      Tonsillectomy       Family History   Problem Relation Age of Onset    Diabetes Mother     Other (CAD) Mother     Other (parkinsons) Father          Allergies:  Allergies[1]    Medications:   insulin aspart  1-10 Units Subcutaneous TID AC and HS    insulin aspart  1-68 Units Subcutaneous TID CC    [START ON 2025] pantoprazole  40 mg Oral QAM AC    metoprolol succinate ER  50 mg Oral Daily Beta Blocker       Continuous Infusions:   sodium chloride 100 mL/hr at 25 1449       Social History:   reports that she has never smoked. She has never used smokeless tobacco. She reports that she does not drink alcohol and does not use drugs.    Review of Systems:  All systems were reviewed and are negative except  as described above in HPI.    Physical Exam:      Temp:  [98.4 °F (36.9 °C)-100 °F (37.8 °C)] 99.1 °F (37.3 °C)  Pulse:  [103-141] 117  Resp:  [16-26] 18  BP: (138-182)/(81-97) 156/91  SpO2:  [97 %-100 %] 100 %    Last 3 Weights   04/05/25 1406 176 lb 9.4 oz (80.1 kg)   04/05/25 1351 176 lb 9.4 oz (80.1 kg)   12/19/20 2249 167 lb (75.8 kg)   11/13/20 1440 160 lb (72.6 kg)           General: No apparent distress  HEENT: No focal deficits.  Neck: supple. JVP normal  Cardiac: Regular rhythm, S1, S2 normal,   No rub or gallop.  No murmur.  Lungs: CTA  Abdomen: Soft, non-tender.   Extremities: No edema  Neurologic: no focal deficits  Skin: Warm and dry.          Telemetry: sinus    Laboratories and Data:  Diagnostics:    EKG, 4/5/2025:  sinus tach, no ischemia,    CXR, 4/5/2025:  clear    Labs:   HEM:  Recent Labs   Lab 04/05/25  0944   WBC 12.0*   HGB 14.8   .0       Chem:  Recent Labs   Lab 04/05/25  0944      K 3.7      CO2 24.0   BUN 15   CREATSERUM 0.79   CA 9.8   *       Recent Labs   Lab 04/05/25  0944   ALT 51*   AST 35*   ALB 4.9*       No results for input(s): \"PTP\", \"INR\" in the last 168 hours.    No results for input(s): \"TROP\", \"CK\" in the last 168 hours.      Impression:   Sinus tachycardia - what's driving it is not clear, though newly diagnosed diabetes, elevated procal, and leukocytosis.  Diabetes  HTN, accelerated.  Mild transaminitis.  Obesity  Diarrhea, belching, dysphagia     Plan:  Will review echo once completed.  Stop lisinopril and begin toprol 50 mg daily.  Blood cx's ordered.  Further rec's pending hospital course.    Baltazar Doherty MD  4/5/2025  3:20 PM  C5         [1] No Known Allergies

## 2025-04-05 NOTE — H&P
[facility]     Lin Ching Patient Status:  Emergency    1965 MRN [id number]   Location [facility] EMERGENCY DEPARTMENT Attending Foster Mai MD   Hosp Day # 0 PCP None Pcp     Chief Complaint: Palpitations    Subjective:    History of Present Illness:     Lin Ching is a 59 year old female who was recently diagnosed wit essential hypertension and GERD presents with palpitations. Patient states that about a week ago she had diarrhea and elevated heart rate. She was seen in  and diagnosed with GERD. PPI Rx. Additionally, Lisinopril started. Patient states that at 8am this morning she began to have palpitations prompting ER evaluation. No chest pain or shortness of breath. No nausea or vomiting. No diarrhea. No fever. No abdominal pain. She feels bloated. No heartburn, indigestion, dysphagia.  No lightheadedness or dizziness. HR ~110 at time of visit.     History/Other:    Past Medical History:  History reviewed. No pertinent past medical history.  Past Surgical History:   Past Surgical History:   Procedure Laterality Date     delivery only      Removal gallbladder      Tonsillectomy        Family History:   History reviewed. No pertinent family history.  Social History:    reports that she has never smoked. She has never used smokeless tobacco. She reports that she does not drink alcohol and does not use drugs.     Allergies: Allergies[1]    Medications:  Medications Ordered Prior to Encounter[2]    Review of Systems:   A comprehensive review of systems was completed.    Pertinent positives and negatives noted in the HPI.    Objective:   Physical Exam:    /89   Pulse 106   Temp 98.4 °F (36.9 °C) (Oral)   Resp 16   LMP 2020   SpO2 100%   General: No acute distress, Alert  Respiratory: No rhonchi, no wheezes  Cardiovascular: S1, S2. Regular tachycardia, no distant heart sounds   Abdomen: Soft, Non-tender, non-distended, positive bowel  sounds  Neuro: No new focal deficits  Extremities: Trace edema      Results:    Labs:      Labs Last 24 Hours:    Recent Labs   Lab 04/05/25  0944   RBC 5.03   HGB 14.8   HCT 41.2   MCV 81.9   MCH 29.4   MCHC 35.9   RDW 12.0   NEPRELIM 8.26*   WBC 12.0*   .0       Recent Labs   Lab 04/05/25  0944   *   BUN 15   CREATSERUM 0.79   EGFRCR 87   CA 9.8   ALB 4.9*      K 3.7      CO2 24.0   ALKPHO 81   AST 35*   ALT 51*   BILT 0.4   TP 7.7       Estimated Glomerular Filtration Rate: 87 mL/min/1.73m2 (result from lab).    No results found for: \"PT\", \"INR\"    Recent Labs   Lab 04/05/25  0944   TROPHS <3       Recent Labs   Lab 04/05/25  0944   PBNP <35       Recent Labs   Lab 04/05/25 0944   PCT 0.45*       Imaging: Imaging data reviewed in Epic.    Assessment & Plan:      #Palpitations, etiology? Trop neg, dimer neg, TSH normal - d/t dehydration in setting of recent diarrhea? Pericardial effusion (seen on bedside ECHO)?  -Admit  -Hydrate  -Check ECHO  -Repeat troponin  -Check inflammatory markers   -Monitor hemodynamics  -Telemetry  -Cardiology consult     #Essential hypertension  -Lisinopril  -Monitor hemodynamics    #Hyperglycemia  -Correctional scale  -Carb scale  -Check A1c    #GERD  -PPI    #Leukocytosis, UA & CXR neg  -Monitor    #Transaminitis, abdominal exam unremarkable  -Monitor LFTs    Plan of care discussed with patient and ER.    Myron Singh MD    Supplementary Documentation:     The 21st Century Cures Act makes medical notes like these available to patients in the interest of transparency. Please be advised this is a medical document. Medical documents are intended to carry relevant information, facts as evident, and the clinical opinion of the practitioner. The medical note is intended as peer to peer communication and may appear blunt or direct. It is written in medical language and may contain abbreviations or verbiage that are unfamiliar.                                      [1] No Known Allergies  [2]   No current facility-administered medications on file prior to encounter.     Current Outpatient Medications on File Prior to Encounter   Medication Sig Dispense Refill    Albuterol Sulfate  (90 Base) MCG/ACT Inhalation Aero Soln Inhale into the lungs every 6 (six) hours as needed for Wheezing.

## 2025-04-05 NOTE — ED PROVIDER NOTES
History     Chief Complaint   Patient presents with    Arrythmia/Palpitations    Hypertension     Palpations since this morning with HTN  at TL        HPI    59 year old female presents for assessment of elevated heart rate.  For the past couple weeks patient has had fluctuating intensity palpitations and fast heart rate, tonight she got up and felt like her heart was beating out of her chest.  She has no chest pain shortness of breath.  At baseline ED. she feels some occasional discomfort in her upper abdomen.  She saw her doctor last week who started her on omeprazole and lisinopril.  Patient had some loose stools and congestion last week which has since normalized.  Reports she had similar things happen in  after COVID which resolved with time.          History reviewed. No pertinent past medical history.    Past Surgical History:   Procedure Laterality Date     delivery only      Removal gallbladder      Tonsillectomy         Social History     Socioeconomic History    Marital status: Single   Tobacco Use    Smoking status: Never    Smokeless tobacco: Never   Vaping Use    Vaping status: Never Used   Substance and Sexual Activity    Alcohol use: Never    Drug use: Never                   Physical Exam     ED Triage Vitals   BP 25 0941 (!) 182/81   Pulse 25 0941 (!) 141   Resp 25 0941 26   Temp 25 0941 100 °F (37.8 °C)   Temp src 25 0958 Oral   SpO2 25 0941 100 %   O2 Device 25 0945 None (Room air)       Physical Exam  Constitutional:       General: She is not in acute distress.  Eyes:      Extraocular Movements: Extraocular movements intact.   Cardiovascular:      Rate and Rhythm: Tachycardia present.      Pulses: Normal pulses.   Pulmonary:      Effort: Pulmonary effort is normal. No respiratory distress.   Abdominal:      General: Abdomen is flat. There is no distension.      Tenderness: There is no abdominal tenderness. There is no guarding.    Musculoskeletal:         General: No swelling or deformity.      Cervical back: Normal range of motion.   Skin:     General: Skin is warm and dry.   Neurological:      General: No focal deficit present.      Mental Status: She is alert.              ED Course     Labs Reviewed   COMP METABOLIC PANEL (14) - Abnormal; Notable for the following components:       Result Value    Glucose 265 (*)     AST 35 (*)     ALT 51 (*)     Albumin 4.9 (*)     All other components within normal limits   CBC WITH DIFFERENTIAL WITH PLATELET - Abnormal; Notable for the following components:    WBC 12.0 (*)     Neutrophil Absolute Prelim 8.26 (*)     Neutrophil Absolute 8.26 (*)     All other components within normal limits   URINALYSIS, ROUTINE - Abnormal; Notable for the following components:    Glucose Urine 1000 (*)     Ketones Urine 20 (*)     All other components within normal limits   C-REACTIVE PROTEIN - Abnormal; Notable for the following components:    C-Reactive Protein 1.40 (*)     All other components within normal limits   PROCALCITONIN - Abnormal; Notable for the following components:    Procalcitonin 0.45 (*)     All other components within normal limits   HEMOGLOBIN A1C - Abnormal; Notable for the following components:    HgbA1C 8.2 (*)     Estimated Average Glucose 189 (*)     All other components within normal limits   TROPONIN I HIGH SENSITIVITY - Normal   PRO BETA NATRIURETIC PEPTIDE - Normal   TSH W REFLEX TO FREE T4 - Normal   D-DIMER - Normal   SED RATE, WESTERGREN (AUTOMATED) - Normal   SARS-COV-2/FLU A AND B/RSV BY PCR (GENEXPERT) - Normal    Narrative:     This test is intended for the qualitative detection and differentiation of SARS-CoV-2, influenza A, influenza B, and respiratory syncytial virus (RSV) viral RNA in nasopharyngeal or nares swabs from individuals suspected of respiratory viral infection consistent with COVID-19 by their healthcare provider. Signs and symptoms of respiratory viral infection  due to SARS-CoV-2, influenza, and RSV can be similar.    Test performed using the Xpert Xpress SARS-CoV-2/FLU/RSV (real time RT-PCR)  assay on the [employer] instrument, [employer], [place] [address].   This test is being used under the Food and Drug Administration's Emergency Use Authorization.    The authorized Fact Sheet for Healthcare Providers for this assay is available upon request from the laboratory.   TROPONIN I HIGH SENSITIVITY   RAINBOW DRAW LAVENDER   RAINBOW DRAW LIGHT GREEN   RAINBOW DRAW BLUE   BLOOD CULTURE   BLOOD CULTURE     XR CHEST AP PORTABLE  (CPT=71045)    Result Date: 4/5/2025  CONCLUSION:  Normal heart size and pulmonary vascularity.  Lungs clear.   LOCATION:  [facility]      Dictated by (CST): Layla Tello MD on 4/05/2025 at 10:13 AM     Finalized by (CST): Layla Tello MD on 4/05/2025 at 10:13 AM            MDM     Vitals:    04/05/25 1348 04/05/25 1350 04/05/25 1351 04/05/25 1406   BP: (!) 160/96 (!) 143/97 (!) 156/91    BP Location: Right arm Right arm Right arm    Pulse: 120 117 117    Resp: 17 18 18    Temp: 99.1 °F (37.3 °C) 99.1 °F (37.3 °C) 99.1 °F (37.3 °C)    TempSrc: Oral Oral Oral    SpO2:  100% 100%    Weight:   80.1 kg 80.1 kg       Patient is tachycardic, appears to be in sinus rhythm.  She is not in any pain at this time.  Concern for electrolyte abnormalities, dehydration, infection, PE.    ED Course as of 04/05/25 1410  ------------------------------------------------------------  Time: 04/05 0945  Comment: EKG interpretation by me: EKG sinus rhythm at a rate of 134, axis nomal, no concerning acute ischemic ST changes, nonspec ant changes appears similar to prior EKGs    ------------------------------------------------------------  Time: 04/05 0952  Comment: Tte 2020:  IMPRESSION:    1. Normal graded exercise treadmill study with below average overall exercise tolerance. The patient had resting sinus tachycardia.  2. Hyperdynamic left ventricular systolic function.  3.  Normal stress echocardiographic response.     ------------------------------------------------------------  Time: 04/05 1018  Comment: CXR interpretation by me with no concerning acute findings    ------------------------------------------------------------  Time: 04/05 1049  Comment: Labs with minimal transaminitis, hyperglycemia without acidosis.  Troponin negative, BNP low, dimer negative.  Thyroid normal.   ------------------------------------------------------------  Time: 04/05 1103  Comment: Performed a point-of-care bedside echo, ventricular ratios appear normal, patient has hyperdynamic function, there is a moderate size pericardial effusion.  No collapse noted  ------------------------------------------------------------  Time: 04/05 1106  Comment: Patient may have had viral pericarditis.  No history of autoimmune disease.  No signs of ischemic disease.  Patient is not uremic.  ------------------------------------------------------------  Time: 04/05 1107  Comment: Patient is not a known diabetic, may have undiagnosied   ------------------------------------------------------------  Time: 04/05 1119  Comment: Heart rate will temporarily go down but sitting at rest patient is in the 120s at this time.  Discussed with cardiology, will admit patient for further care         Disposition and Plan     Clinical Impression:  1. Tachycardia    2. Hyperglycemia    3. Pericardial effusion (HCC)    4. Hypertension, unspecified type        Disposition:  Admit    Follow-up:  No follow-up provider specified.    Medications Prescribed:  Current Discharge Medication List          Hospital Problems       Present on Admission             ICD-10-CM Noted POA    * (Principal) Tachycardia R00.0 4/5/2025 Unknown    Hyperglycemia R73.9 4/5/2025 Unknown    Hypertension, unspecified type I10 4/5/2025 Unknown    Pericardial effusion (HCC) I31.39 4/5/2025 Unknown

## 2025-04-05 NOTE — ED QUICK NOTES
Patient crying out that she has a leg cramp. Alejandra ZEPEDA RN at bedside disconnecting patient from monitor to walk to restroom.

## 2025-04-05 NOTE — ED QUICK NOTES
Orders for admission, patient is aware of plan and ready to go upstairs. Any questions, please call ED RN Mitzy at extension [phone].     Patient Covid vaccination status: Unvaccinated     COVID Test Ordered in ED: SARS-CoV-2/Flu A and B/RSV by PCR (GeneXpert)    COVID Suspicion at Admission: N/A    Running Infusions:      Mental Status/LOC at time of transport: AOx4    Other pertinent information:   CIWA score: N/A   NIH score:  N/A

## 2025-04-05 NOTE — ED INITIAL ASSESSMENT (HPI)
Patient woke up from a nap and started feeling her heart racing. States this sometimes happens when she has to use the bathroom. Saw PCP at Urgent Care this AM. On Omeprazole for GERD., thinks it could be related

## 2025-04-06 ENCOUNTER — APPOINTMENT (OUTPATIENT)
Dept: ULTRASOUND IMAGING | Facility: HOSPITAL | Age: 60
End: 2025-04-06
Attending: HOSPITALIST
Payer: COMMERCIAL

## 2025-04-06 VITALS
SYSTOLIC BLOOD PRESSURE: 119 MMHG | WEIGHT: 176.56 LBS | HEIGHT: 59.49 IN | RESPIRATION RATE: 18 BRPM | OXYGEN SATURATION: 92 % | TEMPERATURE: 98 F | BODY MASS INDEX: 35.13 KG/M2 | HEART RATE: 81 BPM | DIASTOLIC BLOOD PRESSURE: 83 MMHG

## 2025-04-06 LAB
ALBUMIN SERPL-MCNC: 4.2 G/DL (ref 3.2–4.8)
ALBUMIN/GLOB SERPL: 1.8 {RATIO} (ref 1–2)
ALP LIVER SERPL-CCNC: 50 U/L
ALT SERPL-CCNC: 41 U/L
ANION GAP SERPL CALC-SCNC: 10 MMOL/L (ref 0–18)
AST SERPL-CCNC: 30 U/L (ref ?–34)
BASOPHILS # BLD AUTO: 0.04 X10(3) UL (ref 0–0.2)
BASOPHILS NFR BLD AUTO: 0.4 %
BILIRUB SERPL-MCNC: 0.6 MG/DL (ref 0.3–1.2)
BUN BLD-MCNC: 11 MG/DL (ref 9–23)
CALCIUM BLD-MCNC: 9.2 MG/DL (ref 8.7–10.6)
CHLORIDE SERPL-SCNC: 107 MMOL/L (ref 98–112)
CO2 SERPL-SCNC: 23 MMOL/L (ref 21–32)
CREAT BLD-MCNC: 0.68 MG/DL
DEPRECATED HBV CORE AB SER IA-ACNC: 153 NG/ML
EGFRCR SERPLBLD CKD-EPI 2021: 102 ML/MIN/1.73M2 (ref 60–?)
EOSINOPHIL # BLD AUTO: 0.18 X10(3) UL (ref 0–0.7)
EOSINOPHIL NFR BLD AUTO: 2 %
ERYTHROCYTE [DISTWIDTH] IN BLOOD BY AUTOMATED COUNT: 12.1 %
GLOBULIN PLAS-MCNC: 2.4 G/DL (ref 2–3.5)
GLUCOSE BLD-MCNC: 163 MG/DL (ref 70–99)
GLUCOSE BLD-MCNC: 188 MG/DL (ref 70–99)
GLUCOSE BLD-MCNC: 188 MG/DL (ref 70–99)
GLUCOSE BLD-MCNC: 270 MG/DL (ref 70–99)
HCT VFR BLD AUTO: 37.2 %
HGB BLD-MCNC: 13.2 G/DL
IMM GRANULOCYTES # BLD AUTO: 0.02 X10(3) UL (ref 0–1)
IMM GRANULOCYTES NFR BLD: 0.2 %
IRON SERPL-MCNC: 109 UG/DL
LYMPHOCYTES # BLD AUTO: 2.27 X10(3) UL (ref 1–4)
LYMPHOCYTES NFR BLD AUTO: 24.8 %
MAGNESIUM SERPL-MCNC: 1.8 MG/DL (ref 1.6–2.6)
MCH RBC QN AUTO: 29.1 PG (ref 26–34)
MCHC RBC AUTO-ENTMCNC: 35.5 G/DL (ref 31–37)
MCV RBC AUTO: 81.9 FL
MONOCYTES # BLD AUTO: 0.88 X10(3) UL (ref 0.1–1)
MONOCYTES NFR BLD AUTO: 9.6 %
NEUTROPHILS # BLD AUTO: 5.77 X10 (3) UL (ref 1.5–7.7)
NEUTROPHILS # BLD AUTO: 5.77 X10(3) UL (ref 1.5–7.7)
NEUTROPHILS NFR BLD AUTO: 63 %
OSMOLALITY SERPL CALC.SUM OF ELEC: 294 MOSM/KG (ref 275–295)
PLATELET # BLD AUTO: 210 10(3)UL (ref 150–450)
POTASSIUM SERPL-SCNC: 4.1 MMOL/L (ref 3.5–5.1)
POTASSIUM SERPL-SCNC: 4.1 MMOL/L (ref 3.5–5.1)
PROT SERPL-MCNC: 6.6 G/DL (ref 5.7–8.2)
RBC # BLD AUTO: 4.54 X10(6)UL
SODIUM SERPL-SCNC: 140 MMOL/L (ref 136–145)
WBC # BLD AUTO: 9.2 X10(3) UL (ref 4–11)

## 2025-04-06 PROCEDURE — 93970 EXTREMITY STUDY: CPT | Performed by: HOSPITALIST

## 2025-04-06 PROCEDURE — 99239 HOSP IP/OBS DSCHRG MGMT >30: CPT | Performed by: HOSPITALIST

## 2025-04-06 RX ORDER — CYCLOSPORINE 0.5 MG/ML
1 EMULSION OPHTHALMIC 2 TIMES DAILY
Status: DISCONTINUED | OUTPATIENT
Start: 2025-04-06 | End: 2025-04-07

## 2025-04-06 RX ORDER — METOPROLOL SUCCINATE 50 MG/1
50 TABLET, EXTENDED RELEASE ORAL
Qty: 90 TABLET | Refills: 0 | Status: SHIPPED | OUTPATIENT
Start: 2025-04-07 | End: 2025-04-06

## 2025-04-06 RX ORDER — METOPROLOL SUCCINATE 50 MG/1
50 TABLET, EXTENDED RELEASE ORAL
Qty: 90 TABLET | Refills: 0 | Status: SHIPPED | OUTPATIENT
Start: 2025-04-07

## 2025-04-06 RX ORDER — MAGNESIUM OXIDE 400 MG/1
400 TABLET ORAL ONCE
Status: COMPLETED | OUTPATIENT
Start: 2025-04-06 | End: 2025-04-06

## 2025-04-06 RX ORDER — LACTOBACILLUS ACIDOPHILUS 500MM CELL
1 CAPSULE ORAL DAILY
Status: DISCONTINUED | OUTPATIENT
Start: 2025-04-06 | End: 2025-04-07

## 2025-04-06 NOTE — PLAN OF CARE
Assumed patient care approximately 1930. Patient is alert and oriented X4, family present during rounds. SpO2 maintained on room air with saturation maintaining greater than 89%.. NSR on tele, heart rate controlled. Continent of bladder and bowel. Skin intact. Minor pain reported due to cramping declined pain medication, heat packed use. Ambulates with steady gait with standby assist. Education provided on medication, tele, and plan of care, patient and family verbalize understanding. Patient extensively educated on importance to not take home medication nor vitamins brought by family, patient verbalized understanding and agreement, compromised to take hospital provided vitamins      Plan of care: Tele, HR control, IVF          Problem: Diabetes/Glucose Control  Goal: Glucose maintained within prescribed range  Description: INTERVENTIONS:- Monitor Blood Glucose as ordered- Assess for signs and symptoms of hyperglycemia and hypoglycemia- Administer ordered medications to maintain glucose within target range- Assess barriers to adequate nutritional intake and initiate nutrition consult as needed- Instruct patient on self management of diabetes  Outcome: Progressing     Problem: Patient/Family Goals  Goal: Patient/Family Long Term Goal  Description: Patient's Long Term Goal: Go home    Interventions:  - routine lab draws  - medication compliance   - MD rounding  - See additional Care Plan goals for specific interventions  Outcome: Progressing  Goal: Patient/Family Short Term Goal  Description: Patient's Short Term Goal: pain free    Interventions:   -heat/ice pack  -pain medication  -See additional Care Plan goals for specific interventions  Outcome: Progressing

## 2025-04-06 NOTE — PLAN OF CARE
Assumed care at 0730, pt. A&O x4, on RA, NSR on tele. VSS. Up ad nam. No c/o pain. Denies any more palpitations. 0.9 infusing at 100/hr until 1045 per orders. Magnesium replaced per electrolyte protocol. QID accuchecks. Fall and safety precautions in place. Plan of care ongoing.     1530- Gave report to oncoming RN.     Problem: Diabetes/Glucose Control  Goal: Glucose maintained within prescribed range  Description: INTERVENTIONS:- Monitor Blood Glucose as ordered- Assess for signs and symptoms of hyperglycemia and hypoglycemia- Administer ordered medications to maintain glucose within target range- Assess barriers to adequate nutritional intake and initiate nutrition consult as needed- Instruct patient on self management of diabetes  Outcome: Progressing     Problem: Patient/Family Goals  Goal: Patient/Family Long Term Goal  Description: Patient's Long Term Goal: Go home    Interventions:  - routine lab draws  - medication compliance   - MD rounding  - See additional Care Plan goals for specific interventions  Outcome: Progressing  Goal: Patient/Family Short Term Goal  Description: Patient's Short Term Goal: pain free    Interventions:   -heat/ice pack  -pain medication  -See additional Care Plan goals for specific interventions  Outcome: Progressing

## 2025-04-06 NOTE — DISCHARGE SUMMARY
[facility]     Lin Ching Patient Status:  Observation    1965 MRN [id number]   Location [facility] 2NE-A Attending Myron iSngh MD   Hosp Day # 0 PCP None Pcp     Date of Admission: 2025  Date of Discharge:   2025    Discharge Disposition: Home or Self Care    Discharge Diagnosis:  #Palpitations, etiology? Trop neg, dimer neg, TSH normal - d/t dehydration in setting of recent diarrhea? Pericardial effusion (seen on bedside ECHO), but formal ECHO without pericardial effusion, trop neg, CRP & PCT elevated, tachycardia resolved  #Essential hypertension  -Toprol  -Follow-up cultures   -Monitor hemodynamics  -Telemetry  -Cardiology consult      #Calf pain, dimer neg  -Check US     #Diabetes mellitus, type 2, new diagnosed  -Correctional scale  -Carb scale  -Will need to start Metformin on DC, outpatient follow-up with PCP     #GERD  -PPI     #Leukocytosis, UA & CXR neg, resolved     #Transaminitis, abdominal exam unremarkable, resolved     History of Present Illness: Lin Ching is a 59 year old female who was recently diagnosed wit essential hypertension and GERD presents with palpitations. Patient states that about a week ago she had diarrhea and elevated heart rate. She was seen in IC and diagnosed with GERD. PPI Rx. Additionally, Lisinopril started. Patient states that at 8am this morning she began to have palpitations prompting ER evaluation. No chest pain or shortness of breath. No nausea or vomiting. No diarrhea. No fever. No abdominal pain. She feels bloated. No heartburn, indigestion, dysphagia.  No lightheadedness or dizziness. HR ~110 at time of visit.     Brief Synopsis: Patient presented to the ER with palpitations. She was started on IVF and admitted. Initially she was thought to have pericardial effusion, but formal ECHO neg. Cardiology consulted. Toprol initiated with positive response. Work-up otherwise unremarkable. Patient was newly diagnosed with  DM. Metformin initiated. Outpatient follow-up advised. Home on regimen outlined below.     Lace+ Score: 25  59-90 High Risk  29-58 Medium Risk  0-28   Low Risk       TCM Follow-Up Recommendation:  LACE < 29: Low Risk of readmission after discharge from the hospital; Still recommend for TCM follow-up.        Discharge Medication List:     Discharge Medications        START taking these medications        Instructions Prescription details   metFORMIN 500 MG Tabs  Commonly known as: Glucophage      Take 1 tablet (500 mg total) by mouth 2 (two) times daily with meals.   Stop taking on: July 5, 2025  Quantity: 180 tablet  Refills: 0     metoprolol succinate ER 50 MG Tb24  Commonly known as: Toprol XL      Take 1 tablet (50 mg total) by mouth Daily Beta Blocker.   Quantity: 90 tablet  Refills: 0            CONTINUE taking these medications        Instructions Prescription details   albuterol 108 (90 Base) MCG/ACT Aers  Commonly known as: Ventolin HFA      Inhale into the lungs every 6 (six) hours as needed for Wheezing.   Refills: 0     Coenzyme Q-10 100 MG Caps      Take 100 mg by mouth daily.   Refills: 0     cycloSPORINE 0.05 % Emul  Commonly known as: RESTASIS      Place 1 drop into both eyes 2 (two) times daily.   Refills: 0     Garlique 400 MG Tbec  Generic drug: Garlic      Take 1 tablet by mouth daily.   Refills: 0     omeprazole 20 MG Cpdr  Commonly known as: PriLOSEC      Take 2 capsules (40 mg total) by mouth every morning before breakfast.   Refills: 0     Vitamin D 50 MCG (2000 UT) Caps      Take 1.25 capsules (2,500 Units total) by mouth one time.   Refills: 0     vitamin E 400 UNITS Caps  Commonly known as: Alpha-E      Take 1 capsule (400 Units total) by mouth daily.   Refills: 0     Womens Multi Vitamin & Mineral Tabs      Take by mouth.   Refills: 0            STOP taking these medications      lisinopril 10 MG Tabs  Commonly known as: Zestril                  Where to Get Your Medications        These  medications were sent to [id number] - [place], [place] - [address] AT [facility] [address], [phone], [phone]  [address], [place] [address]      Hours: 24-hours Phone: [phone]   metFORMIN 500 MG Tabs  metoprolol succinate ER 50 MG Tb24         [place]    Follow-up appointment:   [address]  [facility] [address] [phone]  Follow up in 2 week(s)      Appointments for Next 30 Days 2025 - 2025      None            -----------------------------------------------------------------------------------------------  PATIENT DISCHARGE INSTRUCTIONS: See electronic chart    Myron Singh MD    Total time spent on discharge plannin minutes     The 21st Century Cures Act makes medical notes like these available to patients in the interest of transparency. Please be advised this is a medical document. Medical documents are intended to carry relevant information, facts as evident, and the clinical opinion of the practitioner. The medical note is intended as peer to peer communication and may appear blunt or direct. It is written in medical language and may contain abbreviations or verbiage that are unfamiliar.

## 2025-04-06 NOTE — PROGRESS NOTES
[facility]   part of [facility]     Hospitalist Progress Note     Lin Ching Patient Status:  Observation    1965 MRN [id number]   Location [facility] 2NE-A Attending Myron Singh MD   Hosp Day # 0 PCP None Pcp     Chief Complaint: Palpitations    Subjective:   Patient without chest pain or shortness of breath. Complaints of cramping in calves, bilaterally. No nausea or vomiting. One loose stool after electrolyte replacement. No melena or hematochezia. No abdominal pain.     Current medications:   acidophilus  1 each Oral Daily    cycloSPORINE  1 drop Both Eyes BID    insulin aspart  1-10 Units Subcutaneous TID AC and HS    insulin aspart  1-68 Units Subcutaneous TID CC    pantoprazole  40 mg Oral QAM AC    metoprolol succinate ER  50 mg Oral Daily Beta Blocker    cholecalciferol  2,000 Units Oral Daily    vitamin E  400 Units Oral Daily    multivitamin  1 tablet Oral Daily       Objective:    Review of Systems:   10 point ROS completed and was negative, except for pertinent positive and negatives stated in subjective.    Vital signs:  Temp:  [97.8 °F (36.6 °C)-99.1 °F (37.3 °C)] 97.8 °F (36.6 °C)  Pulse:  [] 81  Resp:  [16-18] 18  BP: (110-160)/(72-97) 119/83  SpO2:  [92 %-100 %] 92 %  Patient Weight for the past 72 hrs:   Weight   25 1351 176 lb 9.4 oz (80.1 kg)   25 1406 176 lb 9.4 oz (80.1 kg)     Physical Exam:    General: No acute distress.   Respiratory: Clear to auscultation bilaterally.  Cardiovascular: S1, S2. Regular rate and rhythm.  Abdomen: Soft, nontender, nondistended.  Positive bowel sounds.   Extremities: No edema. Tenderness to palpation of bilateral calves.  Neuro: AAOx3    Diagnostic Data:    Labs:  Recent Labs   Lab 25  0944 25  0558   WBC 12.0* 9.2   HGB 14.8 13.2   MCV 81.9 81.9   .0 210.0       Recent Labs   Lab 25  0944 25  0558   * 188*   BUN 15 11   CREATSERUM 0.79 0.68   CA 9.8 9.2   ALB 4.9* 4.2     140   K 3.7 4.1  4.1    107   CO2 24.0 23.0   ALKPHO 81 50   AST 35* 30   ALT 51* 41   BILT 0.4 0.6   TP 7.7 6.6       Estimated Creatinine Clearance: 115.4 mL/min (based on SCr of 0.68 mg/dL).    No results for input(s): \"PTP\", \"INR\" in the last 168 hours.           COVID-19 Lab Results    COVID-19  Lab Results   Component Value Date    COVID19 Not Detected 04/05/2025    COVID19 Not Detected 01/13/2021    COVID19 Not Detected 12/23/2020       Pro-Calcitonin  Recent Labs   Lab 04/05/25  0944   PCT 0.45*       Cardiac  Recent Labs   Lab 04/05/25  0944   PBNP <35       Creatinine Kinase  No results for input(s): \"CK\" in the last 168 hours.    Inflammatory Markers  Recent Labs   Lab 04/05/25  0944 04/06/25  0558   CRP 1.40*  --    ANTELMO  --  153   DDIMER <0.27  --        Recent Labs   Lab 04/05/25  0944 04/05/25  1835   TROPHS <3 <3       Imaging: Imaging data reviewed in Epic.    Medications:    acidophilus  1 each Oral Daily    cycloSPORINE  1 drop Both Eyes BID    insulin aspart  1-10 Units Subcutaneous TID AC and HS    insulin aspart  1-68 Units Subcutaneous TID CC    pantoprazole  40 mg Oral QAM AC    metoprolol succinate ER  50 mg Oral Daily Beta Blocker    cholecalciferol  2,000 Units Oral Daily    vitamin E  400 Units Oral Daily    multivitamin  1 tablet Oral Daily       Assessment & Plan:      #Palpitations, etiology? Trop neg, dimer neg, TSH normal - d/t dehydration in setting of recent diarrhea? Pericardial effusion (seen on bedside ECHO), but formal ECHO without pericardial effusion, trop neg, CRP & PCT elevated, tachycardia resolved  #Essential hypertension  -Toprol  -Follow-up cultures   -Monitor hemodynamics  -Telemetry  -Cardiology consult      #Calf pain, dimer neg  -Check US    #Diabetes mellitus, type 2, new diagnosed  -Correctional scale  -Carb scale  -Will need to start Metformin on DC, outpatient follow-up with PCP     #GERD  -PPI     #Leukocytosis, UA & CXR neg, resolved     #Transaminitis,  abdominal exam unremarkable, resolved     At this point Ms. Ching is expected to be discharge to: Home    Plan of care discussed with patient, RN and cardiologist.    Myron Singh MD        Supplementary Documentation:   DVT Mechanical Prophylaxis:   SCDs,    DVT Pharmacologic Prophylaxis   Medication   None                Code Status: Not on file  Fuentes: No urinary catheter in place  Fuentes Duration (in days):   Central line:    SITA: 4/7/2025

## 2025-04-06 NOTE — DIETARY NOTE
[facility]   part of [facility]   CLINICAL NUTRITION    Lin Ching     Admitting diagnosis:  Pericardial effusion (HCC) [I31.39]  Tachycardia [R00.0]  Hyperglycemia [R73.9]  Hypertension, unspecified type [I10]    Ht: 151.1 cm (4' 11.49\")  Wt: 80.1 kg (176 lb 9.4 oz).   Body mass index is 35.08 kg/m².  IBW: 44.9 kg    Wt Readings from Last 6 Encounters:   04/05/25 80.1 kg (176 lb 9.4 oz)   12/19/20 75.8 kg (167 lb)   11/13/20 72.6 kg (160 lb)   10/30/20 79.4 kg (175 lb)        Labs/Meds reviewed  -A1c:8.2 (4/5/25), POC Glu:163-270, Glu:188  -Insulin, MVI, Vit D, Vit E, Mag Ox, Probiotic    Diet:       Procedures    Carbohydrate controlled diet 1800 kcal/60 grams; Is Patient on Accuchecks? No     Percent Meals Eaten (last 3 days)       Date/Time Percent Meals Eaten (%)    04/05/25 1828 100 %          Pt chart reviewed d/t nutrition consult for new DM; A1c:8.2 (4/5/25).    Pt's family present at time of diet education. Provided handout on CHO Counting for People with DM. Discussed CHO foods, CHO counting, label reading, sample menu/healthy snack ideas, and planning meals using the DM My Plate Method. Set up DM videos on TV. All pt's nutrition related questions answered at this time. Provided contact information for outpatient RD and DM Center. Pt would likely benefit from further DM ed in an outpatient setting, given new DM diagnosis. Plans to start metformin at discharge.    Patient reports good appetite at this time.  Nursing notes reports Percent Meals Eaten (%): 100 % intake for last meal.  Last BM:4/5. Skin intact.     No significant weight changes noted per EMR hx.    PMH:HTN, GERD, recent diarrhea, dairy intolerance per pt report.     Patient is at low nutrition risk at this time.    Please consult if patient status changes or nutrition issues arise.    Natty Goel MS, RD, LDN  Clinical Dietitian  Ext:[phone]

## 2025-04-06 NOTE — PROGRESS NOTES
Progress Note  Lin Ching Patient Status:  Observation    1965 MRN [id number]   Location [facility] 2NE-A Attending Myron Singh MD   Hosp Day # 0 PCP None Pcp     Subjective   No new complaints, denies chest pain, sob, no longer feels palpitations.       Objective:   /73 (BP Location: Right arm)   Pulse 103   Temp 98.1 °F (36.7 °C) (Oral)   Resp 17   Wt 176 lb 9.4 oz (80.1 kg)   LMP 2020   SpO2 95%   BMI 35.67 kg/m²     Telemetry: sinus rhythm     Intake/Output:    Intake/Output Summary (Last 24 hours) at 2025  Last data filed at 2025 1828  Gross per 24 hour   Intake 360 ml   Output 550 ml   Net -190 ml       Wt Readings from Last 3 Encounters:   25 176 lb 9.4 oz (80.1 kg)   20 167 lb (75.8 kg)   20 160 lb (72.6 kg)         Labs:  Recent Labs   Lab 25  0944   *   BUN 15   CREATSERUM 0.79   EGFRCR 87   CA 9.8      K 3.7      CO2 24.0     Recent Labs   Lab 25  0944   RBC 5.03   HGB 14.8   HCT 41.2   MCV 81.9   MCH 29.4   MCHC 35.9   RDW 12.0   NEPRELIM 8.26*   WBC 12.0*   .0         Recent Labs   Lab 25  0944 25  1835   TROPHS <3 <3       Review of Systems:     10 point review of systems completed and negative except as noted in HPI      Exam:     Physical Exam:  General: Alert and oriented x 3. No apparent distress.   HEENT: Normocephalic, neck supple, no thyromegaly or adenopathy.  Neck: No JVD, carotids 2+, no bruits.  Cardiac: Regular rate and rhythm. S1, S2 normal. No murmur, pericardial rub, S3, or extra cardiac sounds.  Lungs: Clear without wheezes, rales, rhonchi or dullness.  Normal excursions and effort.  Abdomen: Soft, non-tender. BS-present.  Extremities: Without clubbing or cyanosis. No edema.    Neurologic: Alert and oriented, normal affect. No focal defects  Skin: Warm and dry.     Medications:     insulin aspart  1-10 Units Subcutaneous TID AC and HS    insulin aspart  1-68 Units  Subcutaneous TID CC    [START ON 4/6/2025] pantoprazole  40 mg Oral QAM AC    metoprolol succinate ER  50 mg Oral Daily Beta Blocker    cholecalciferol  2,000 Units Oral Daily    [Held by provider] cycloSPORINE  1 drop Both Eyes BID    vitamin E  400 Units Oral Daily    multivitamin  1 tablet Oral Daily    potassium chloride  40 mEq Oral Once      sodium chloride 100 mL/hr at 04/05/25 1449       Diagnostic Studies:     Echo 4/5/25  Conclusions:     1. Left ventricle: The cavity size was normal. Wall thickness was normal.      Systolic function was vigorous. The estimated ejection fraction was      70-75%, by visual assessment. No diagnostic evidence for regional wall      motion abnormalities. Left ventricular diastolic function parameters were      normal.   2. Right ventricle: The cavity size was normal. Systolic function was      normal.   3. Pulmonary arteries: Systolic pressure could not be accurately estimated      due to insufficient tricuspid regurgitation.   4. Pericardium, extracardiac: No significant pericardial effusion was      identified.   Impressions:  No previous study was available for comparison.     Assessment and Plan:     Assessment:  # sinus tachycardia - unclear etiology possibly dehydration with recent diarrhea    Echo showed hyperdynamic LVEF, no rwma, or significant valvular disease   Encourage oral fluids   Started on BB   # leukocytosis - monitor   UA and CXR neg   # HTN - stable   Metoprolol   # T2DM - A1c 8.2 - new diagnosis for her   Per primary   # mildly elevated liver enzymes - monitor   # diarrhea, belching, dysphagia   # Obesity       Plan:  Echo finding reassuring  Now in sinus rhythm   Continue metoprolol   Risk factors modification including diet, weight loss, and diabetes management       Cardiology attending:  Pt seen and independently examined.  Note edited.  Plan of care performed by myself in its entirety.  Discussed with patient and no active CV issues.  Home on Toprol  50 mg daily once cleared by hospitalist.  Will follow peripherally.    Baltazar Doherty MD  L2    Plan of care discussed with patient, RN.      Waleska Kaye, APRN  4/6/2025  Ph [phone] (Rudy)  Ph [phone] (Kermit)

## 2025-04-07 ENCOUNTER — PATIENT OUTREACH (OUTPATIENT)
Dept: CASE MANAGEMENT | Age: 60
End: 2025-04-07

## 2025-04-07 NOTE — PROGRESS NOTES
Left message on mailbox for patient to call care manager back for transitional care management/hospital follow-up call.  Care  information included in message.    1st attempt

## 2025-04-07 NOTE — PROGRESS NOTES
Left message on mailbox for patient to call care manager back for transitional care management/hospital follow-up call.  Care  information included in message.

## 2025-04-07 NOTE — PROGRESS NOTES
Pt discharged home as ordered. Dc instructions provided to pt; pt verbalizes understanding of instructions. Tele monitor off and returned. Iv dc'd prior to dc home. Pt to [facility] via wheelchair, for dc home, accompanied by PCT.

## 2025-04-08 ENCOUNTER — PATIENT OUTREACH (OUTPATIENT)
Dept: CASE MANAGEMENT | Age: 60
End: 2025-04-08

## 2025-04-08 NOTE — PROGRESS NOTES
Hospital Follow up for Diabetes(Discharge 4/6 edw)   Last A1C Value: 8.2% Date: [4/5/2025]     PCP   No pcp on file        Attempt 1: pt on her way to urgent care; stated would like a call back another time

## 2025-04-09 NOTE — PROGRESS NOTES
Hospital Follow up for Diabetes(Discharge 4/6 edw)   Last A1C Value: 8.2% Date: [4/5/2025]     PCP   No pcp on file      Attempt #2:  Left message on voicemail for patient to call transitions specialist back to schedule follow up appointments. Provided Transitions specialist scheduling phone number [phone].

## 2025-04-10 NOTE — PROGRESS NOTES
Hospital Follow up for Diabetes(Discharge 4/6 edw)   Last A1C Value: 8.2% Date: [4/5/2025]     PCP   No pcp on file   unable to contact after multiple attempts    Attempt 3: someone picked up and then hung up the line' unable to leave vm   Closing encounter

## 2025-04-21 NOTE — PROGRESS NOTES
Multiple attempts to reach patient and messages left with no return call.  Past Transitional Care Management timeframe 4/20/25.  Encounter closing.

## 2025-04-28 ENCOUNTER — TELEPHONE (OUTPATIENT)
Facility: CLINIC | Age: 60
End: 2025-04-28

## (undated) NOTE — LETTER
4/28/2025    Lin Ching  [address] [place] [address]    Dear Lin,    Due to a missed appointment on 04/26/2025, your account has been charged $50.    No-show policy    A $50 fee will be charged for missed appointments.    To accommodate all our patients, we require at least 24 hours' notice if you need to cancel or reschedule your appointment.    If three appointments are missed within a 12-month period, we may begin the dismissal process for future care at [facility].    We value the relationship we have established with you.  We hope to continue to serve your health care needs.      Sincerely,    [facility]